# Patient Record
(demographics unavailable — no encounter records)

---

## 2025-06-09 NOTE — ASSESSMENT
[FreeTextEntry1] : ADE is a 14 year old M with a L clavicle fracture sustained on ***  Today's visit included obtaining the history from the child and parent, due to the child's age, the child could not be considered a reliable historian, requiring the parent to act as an independent historian. The condition, natural history, and prognosis were explained to the patient and family. The clinical findings and images were reviewed with the family.   All questions were answered, the family expresses understanding and agrees with the plan of care.

## 2025-06-09 NOTE — HISTORY OF PRESENT ILLNESS
[FreeTextEntry1] : ADE is a 14 year old M who presents for evaluation of L clavicle injury sustained on ***.  He is here for orthopaedic evaluation.